# Patient Record
Sex: FEMALE | Race: WHITE | URBAN - METROPOLITAN AREA
[De-identification: names, ages, dates, MRNs, and addresses within clinical notes are randomized per-mention and may not be internally consistent; named-entity substitution may affect disease eponyms.]

---

## 2024-12-07 ENCOUNTER — OFFICE VISIT (OUTPATIENT)
Dept: URGENT CARE | Age: 74
End: 2024-12-07
Payer: MEDICARE

## 2024-12-07 VITALS
OXYGEN SATURATION: 96 % | WEIGHT: 160 LBS | BODY MASS INDEX: 30.21 KG/M2 | HEIGHT: 61 IN | DIASTOLIC BLOOD PRESSURE: 84 MMHG | SYSTOLIC BLOOD PRESSURE: 136 MMHG | TEMPERATURE: 97.8 F | HEART RATE: 56 BPM

## 2024-12-07 DIAGNOSIS — J01.00 ACUTE NON-RECURRENT MAXILLARY SINUSITIS: Primary | ICD-10-CM

## 2024-12-07 RX ORDER — ATORVASTATIN CALCIUM 40 MG/1
1 TABLET, FILM COATED ORAL NIGHTLY
COMMUNITY
Start: 2024-07-10

## 2024-12-07 RX ORDER — ESCITALOPRAM OXALATE 5 MG/1
5 TABLET ORAL
COMMUNITY
Start: 2024-10-17

## 2024-12-07 RX ORDER — METOPROLOL SUCCINATE 50 MG/1
50 TABLET, EXTENDED RELEASE ORAL
COMMUNITY
Start: 2024-09-27

## 2024-12-07 RX ORDER — DOXYLAMINE/DM/ACETAMINOPHEN/GG 6.25-10 MG
2 CAPSULE, SEQUENTIAL ORAL EVERY 6 HOURS PRN
Qty: 30 CAPSULE | Refills: 0 | Status: SHIPPED | OUTPATIENT
Start: 2024-12-07 | End: 2024-12-12

## 2024-12-07 RX ORDER — AMOXICILLIN 500 MG/1
500 CAPSULE ORAL EVERY 8 HOURS SCHEDULED
Qty: 21 CAPSULE | Refills: 0 | Status: SHIPPED | OUTPATIENT
Start: 2024-12-07 | End: 2024-12-14

## 2024-12-07 RX ORDER — LEVOTHYROXINE SODIUM 50 UG/1
TABLET ORAL
COMMUNITY
Start: 2024-10-16

## 2024-12-07 RX ORDER — BUSPIRONE HYDROCHLORIDE 5 MG/1
5 TABLET ORAL 2 TIMES DAILY PRN
COMMUNITY

## 2024-12-07 RX ORDER — EPINEPHRINE 0.3 MG/.3ML
0.3 INJECTION SUBCUTANEOUS
COMMUNITY
Start: 2024-07-11

## 2024-12-07 NOTE — PROGRESS NOTES
"Subjective   Patient ID: Latrice Bowden is a 73 y.o. female. They present today with a chief complaint of Nasal Congestion (Congestiona and cough x 1 week).    History of Present Illness  Latrice is a 73 year old female presents to  with nasal congestion, sinus pressure and drainage x 1.5 weeks. No fevers, SOB or CP.           Past Medical History  Allergies as of 12/07/2024 - Reviewed 12/07/2024   Allergen Reaction Noted    Lisinopril Unknown 10/14/2023    Balsam peru Rash 04/19/2024    Benzyl alcohol Rash 04/19/2024    Cananga oil (bulk) Rash 04/19/2024    Dermatitis antigens Rash 04/19/2024    Limonene Rash 04/19/2024    Linalool Rash 04/19/2024    Methylisothiazolinone Rash 04/19/2024    Propolis (bee glue) Rash 04/19/2024       (Not in a hospital admission)       No past medical history on file.    No past surgical history on file.         Review of Systems  Review of Systems                               Objective    Vitals:    12/07/24 1207   BP: 136/84   Pulse: 56   Temp: 36.6 °C (97.8 °F)   TempSrc: Oral   SpO2: 96%   Weight: 72.6 kg (160 lb)   Height: 1.549 m (5' 1\")     No LMP recorded.    Physical Exam  Vitals and nursing note reviewed.   Constitutional:       General: She is not in acute distress.     Appearance: Normal appearance. She is not toxic-appearing.   HENT:      Head: Normocephalic and atraumatic.      Right Ear: Tympanic membrane normal.      Left Ear: Tympanic membrane normal.      Nose: Congestion present.      Right Sinus: Maxillary sinus tenderness present.      Left Sinus: Maxillary sinus tenderness present.      Mouth/Throat:      Mouth: Mucous membranes are moist.      Pharynx: No oropharyngeal exudate or posterior oropharyngeal erythema.   Eyes:      Extraocular Movements: Extraocular movements intact.      Conjunctiva/sclera: Conjunctivae normal.      Pupils: Pupils are equal, round, and reactive to light.   Cardiovascular:      Rate and Rhythm: Normal rate and regular rhythm.    "   Heart sounds: No murmur heard.  Pulmonary:      Effort: Pulmonary effort is normal.      Breath sounds: Normal breath sounds.   Musculoskeletal:      Cervical back: Normal range of motion and neck supple.   Lymphadenopathy:      Cervical: No cervical adenopathy.   Neurological:      Mental Status: She is alert and oriented to person, place, and time.   Psychiatric:         Mood and Affect: Mood normal.         Procedures    Point of Care Test & Imaging Results from this visit  No results found for this visit on 12/07/24.   No results found.    Diagnostic study results (if any) were reviewed by Dior Camejo PA-C.    Assessment/Plan   Allergies, medications, history, and pertinent labs/EKGs/Imaging reviewed by Dior Camejo PA-C.     Medical Decision Making  Pt presents with nasal congestion, pressure and drainage. Discussed symptoms and clinical presentation findings suggestive of an acute sinusitis likely secondary to allergic rhinosinusitis versus viral etiologies. Advised continued close symptom monitoring and supportive treatment measures. Recommend OTC antihistamine, fluticasone and Sudafed as needed for added symptom relief. Given the patient's duration of symptoms and lack of improvement with supportive treatment we agreed to initiate antimicrobial coverage and prescribed amoxil, coricidin HBP.  Close follow up with PCP as needed.       Orders and Diagnoses  Diagnoses and all orders for this visit:  Acute non-recurrent maxillary sinusitis  -     amoxicillin (Amoxil) 500 mg capsule; Take 1 capsule (500 mg) by mouth every 8 hours for 7 days.  -     doxylamine-DM-acetaminophen-gg (Coricidin HBP Max Cold-Flu D-N) 6.25 mg-10 mg- 325 mg (nt) capsule, sequential; Take 2 tablets by mouth every 6 hours if needed (cough, congestion) for up to 5 days.      Medical Admin Record      Patient disposition: Home    Electronically signed by Dior Camejo PA-C  12:51 PM